# Patient Record
Sex: MALE | Race: WHITE | URBAN - METROPOLITAN AREA
[De-identification: names, ages, dates, MRNs, and addresses within clinical notes are randomized per-mention and may not be internally consistent; named-entity substitution may affect disease eponyms.]

---

## 2023-10-23 ENCOUNTER — OFFICE VISIT (OUTPATIENT)
Age: 43
End: 2023-10-23
Payer: COMMERCIAL

## 2023-10-23 VITALS — OXYGEN SATURATION: 97 % | WEIGHT: 138 LBS | TEMPERATURE: 98.2 F | HEART RATE: 81 BPM | RESPIRATION RATE: 17 BRPM

## 2023-10-23 DIAGNOSIS — M54.50 LUMBAR PAIN: Primary | ICD-10-CM

## 2023-10-23 DIAGNOSIS — M54.16 LUMBAR NEURITIS: ICD-10-CM

## 2023-10-23 PROCEDURE — 99204 OFFICE O/P NEW MOD 45 MIN: CPT | Performed by: PHYSICAL MEDICINE & REHABILITATION

## 2023-10-23 PROCEDURE — 72100 X-RAY EXAM L-S SPINE 2/3 VWS: CPT | Performed by: PHYSICAL MEDICINE & REHABILITATION

## 2023-10-23 RX ORDER — GABAPENTIN 300 MG/1
300 CAPSULE ORAL 3 TIMES DAILY
Qty: 90 CAPSULE | Refills: 1 | Status: SHIPPED | OUTPATIENT
Start: 2023-10-23 | End: 2023-12-22

## 2023-10-23 RX ORDER — METHYLPREDNISOLONE 4 MG/1
TABLET ORAL
Qty: 1 KIT | Refills: 0 | Status: SHIPPED | OUTPATIENT
Start: 2023-10-23

## 2023-10-23 RX ORDER — NAPROXEN 500 MG/1
500 TABLET ORAL 2 TIMES DAILY WITH MEALS
Qty: 30 TABLET | Refills: 0 | Status: SHIPPED | OUTPATIENT
Start: 2023-10-23

## 2023-11-24 DIAGNOSIS — M54.16 LUMBAR NEURITIS: ICD-10-CM

## 2023-11-24 DIAGNOSIS — M54.50 LUMBAR PAIN: ICD-10-CM

## 2023-11-27 RX ORDER — GABAPENTIN 300 MG/1
300 CAPSULE ORAL 3 TIMES DAILY
Qty: 90 CAPSULE | Refills: 1 | OUTPATIENT
Start: 2023-11-27 | End: 2024-01-26

## 2023-11-27 RX ORDER — NAPROXEN 500 MG/1
500 TABLET ORAL 2 TIMES DAILY WITH MEALS
Qty: 30 TABLET | Refills: 0 | OUTPATIENT
Start: 2023-11-27

## 2023-11-27 NOTE — TELEPHONE ENCOUNTER
Last visit-10/23/23  Next visit- 12/4/2023  Last rx- 10/23/2023, # 90, Rf-1        I attempted to contact  to inform him that the gabapentin prescription written on 10/23 has a refill on it. I also wanted to let him know that the naproxen is a short term supply only. He did not answer,  I left a generic voicemail with office number for call back .

## 2023-12-14 NOTE — PROGRESS NOTES
[facility]  [address]  [address]  [place], [place]  Tel: [phone]  Fax: [phone]          PROGRESS NOTE      HISTORY OF PRESENT ILLNESS:  The patient is a 37 y.o. male and was seen today for follow up of low back pain radiating to the BLE(alternates) in a S1 distribution to the feet, involves digits 2-4 on both feet. Patient says his pain started in February or March 2023 without injury. Patient says his pain is progressive in nature. Pt denies change in bowel or bladder habits. His pain is exacerbated by no position. Patient denies recent fevers, weight loss, rashes, or skin sores. No stomach ulcers or bleeding disorders. No history of spinal surgery or injections. He is done with PT (December 2023). He had benefit with PT. He is compliant with his HEP. Pt denies DM. Patient reports that to his knowledge his kidneys function properly, no labs available. He had benefit with the MDP and Naproxen (December 2023). PmHx of Smoker. Lumbar spine plain films dated 10/23/2023. 2 views: AP and Lateral. revealed: Age appropriate degenerative changes. No acute pathology identified. At his last clinical appointment, I recommended he use the back brace sparingly. I started him on a Medrol Dosepak, followed by Naproxen 500 mg BID with food for 2 weeks after the MDP. I referred him to physical therapy with an emphasis on HEP. I tried him on Gabapentin 300 mg TID. The patient returns today with pain location and distribution remains unchanged. He rates his pain 3-9/10, previously 2-10/10. Patient says that overall his pain is a little better when compared to the last office visit. He tolerates the Gabapentin 300 mg TID with benefit. He notes some initial drowsiness that has improved. He had benefit with the MDP and Naproxen (December 2023). He is done with PT (December 2023). He had benefit with PT. He is compliant with his HEP.  Pt denies change in bowel or bladder

## 2023-12-15 ENCOUNTER — OFFICE VISIT (OUTPATIENT)
Age: 43
End: 2023-12-15
Payer: COMMERCIAL

## 2023-12-15 VITALS
WEIGHT: 144 LBS | SYSTOLIC BLOOD PRESSURE: 116 MMHG | DIASTOLIC BLOOD PRESSURE: 74 MMHG | OXYGEN SATURATION: 96 % | BODY MASS INDEX: 22.6 KG/M2 | HEART RATE: 100 BPM | HEIGHT: 67 IN | TEMPERATURE: 97.8 F

## 2023-12-15 DIAGNOSIS — M54.16 LUMBAR NEURITIS: ICD-10-CM

## 2023-12-15 DIAGNOSIS — M54.50 LUMBAR PAIN: Primary | ICD-10-CM

## 2023-12-15 PROCEDURE — 99214 OFFICE O/P EST MOD 30 MIN: CPT | Performed by: PHYSICAL MEDICINE & REHABILITATION

## 2023-12-15 RX ORDER — GABAPENTIN 600 MG/1
600 TABLET ORAL 3 TIMES DAILY
Qty: 90 TABLET | Refills: 1 | Status: SHIPPED | OUTPATIENT
Start: 2023-12-15 | End: 2024-02-13

## 2023-12-29 ENCOUNTER — HOSPITAL ENCOUNTER (OUTPATIENT)
Age: 43
Discharge: HOME | End: 2023-12-29
Payer: COMMERCIAL

## 2023-12-29 DIAGNOSIS — M54.16 LUMBAR NEURITIS: ICD-10-CM

## 2023-12-29 DIAGNOSIS — M54.50 LUMBAR PAIN: ICD-10-CM

## 2023-12-29 PROCEDURE — 72148 MRI LUMBAR SPINE W/O DYE: CPT

## 2024-01-26 ENCOUNTER — OFFICE VISIT (OUTPATIENT)
Age: 44
End: 2024-01-26
Payer: COMMERCIAL

## 2024-01-26 VITALS
HEART RATE: 84 BPM | BODY MASS INDEX: 22.87 KG/M2 | OXYGEN SATURATION: 97 % | RESPIRATION RATE: 18 BRPM | WEIGHT: 146 LBS | TEMPERATURE: 98.5 F

## 2024-01-26 DIAGNOSIS — M48.07 FORAMINAL STENOSIS OF LUMBOSACRAL REGION: ICD-10-CM

## 2024-01-26 DIAGNOSIS — M48.062 SPINAL STENOSIS OF LUMBAR REGION WITH NEUROGENIC CLAUDICATION: ICD-10-CM

## 2024-01-26 DIAGNOSIS — M48.07 STENOSIS OF LATERAL RECESS OF LUMBOSACRAL SPINE: ICD-10-CM

## 2024-01-26 DIAGNOSIS — M54.16 LUMBAR NEURITIS: ICD-10-CM

## 2024-01-26 DIAGNOSIS — M51.26 HNP (HERNIATED NUCLEUS PULPOSUS), LUMBAR: Primary | ICD-10-CM

## 2024-01-26 DIAGNOSIS — M51.37 DDD (DEGENERATIVE DISC DISEASE), LUMBOSACRAL: ICD-10-CM

## 2024-01-26 PROCEDURE — 99214 OFFICE O/P EST MOD 30 MIN: CPT | Performed by: PHYSICAL MEDICINE & REHABILITATION

## 2024-01-26 RX ORDER — GABAPENTIN 600 MG/1
600 TABLET ORAL 3 TIMES DAILY
Qty: 270 TABLET | Refills: 0 | Status: SHIPPED | OUTPATIENT
Start: 2024-01-26 | End: 2024-04-25

## 2024-01-26 NOTE — PROGRESS NOTES
[facility]  [address]  [place] [address]  Tel: [phone]  Fax: [phone]          PROGRESS NOTE      HISTORY OF PRESENT ILLNESS:  The patient is a 43 y.o. male and was seen today for follow up of low back pain radiating to the BLE(alternates) in a S1 distribution to the feet, involves digits 2-4 on both feet. Patient says his pain started in February or March 2023 without injury. Patient says his pain is progressive in nature. Pt denies change in bowel or bladder habits. His pain is exacerbated by no position. Patient denies recent fevers, weight loss, rashes, or skin sores. No stomach ulcers or bleeding disorders. No history of spinal surgery or injections. He is done with PT (December 2023). He had benefit with PT. He is compliant with his HEP. Pt denies DM. Patient reports that to his knowledge his kidneys function properly, no labs available. He had benefit with the MDP and Naproxen (December 2023). PmHx of Smoker.  Lumbar spine plain films dated 10/23/2023. 2 views: AP and Lateral. revealed: Age appropriate degenerative changes. No acute pathology identified. At his last clinical appointment,  I recommended the patient continue to perform his HEP everyday. I increased his Gabapentin 300 mg TID to 600 mg TID. Patient advised to call the office if intolerant to higher dose. I ordered a L spine MRI.       The patient returns today with pain location and distribution remains unchanged. He rates his pain 2-10/10, previously 3-9/10. Patient says that overall his pain is a little better when compared to the last office visit. He is compliant with his HEP 4x weekly. He is tolerating Gabapentin 600 mg TID with some benefit.     Lumbar spine MRI dated 12/29/2023 films independently reviewed. Per report, Moderate disc protrusion at L5-S1 with annular fissure slightly asymmetric to the right. There is moderate canal stenosis at L5-S1 with severe right

## 2024-02-21 ENCOUNTER — TELEPHONE (OUTPATIENT)
Age: 44
End: 2024-02-21

## 2024-02-21 DIAGNOSIS — F41.9 ANXIETY: Primary | ICD-10-CM

## 2024-02-22 RX ORDER — DIAZEPAM 10 MG/1
TABLET ORAL
Qty: 1 TABLET | Refills: 0 | Status: SHIPPED | OUTPATIENT
Start: 2024-02-22 | End: 2024-02-28

## 2024-02-26 DIAGNOSIS — M54.50 LUMBAR PAIN: ICD-10-CM

## 2024-02-26 DIAGNOSIS — M54.16 LUMBAR NEURITIS: ICD-10-CM

## 2024-02-27 RX ORDER — GABAPENTIN 600 MG/1
600 TABLET ORAL 3 TIMES DAILY
Qty: 90 TABLET | Refills: 1 | OUTPATIENT
Start: 2024-02-27 | End: 2024-04-27

## 2024-03-12 NOTE — PROGRESS NOTES
BMI 24.28 kg/m²       CONSTITUTIONAL: NAD, A&O x 3    Ambulates without an assistive device.    MOTOR:  Straight Leg Raise: Negative, Bilaterally       Hip Flex Knee Ext Knee Flex Ankle DF GTE Ankle PF Tone   Right +4/5 +4/5 +4/5 +4/5 +4/5 +4/5 +4/5   Left +4/5 +4/5 +4/5 +4/5 +4/5 +4/5 +4/5     SENSATION: Sensation is intact to light touch throughout.         ASSESSMENT   Nakul was seen today for follow-up.    Diagnoses and all orders for this visit:    HNP (herniated nucleus pulposus), lumbar    Spinal stenosis of lumbar region with neurogenic claudication    Lumbar neuritis    Stenosis of lateral recess of lumbosacral spine    Foraminal stenosis of lumbosacral region    DDD (degenerative disc disease), lumbosacral          IMPRESSION AND PLAN:  Patient returns to the office today with c/o low back pain radiating to the BLE(alternates) in a S1 distribution to the feet, involves digits 2-4 on right foot. Multiple treatment options were discussed. He is not interested in surgery or injection at this time. I discussed increasing neuropathic medication, pt wished to proceed. I will increase his Gabapentin 600 mg TID to 800 mg TID. He was advised to call the office if intolerant to higher dose and before running out of his medication. He does not need to refill his medication at this time. I recommended he increase the frequency of his HEP to daily. The patient is Neurologically intact. I will see the patient back in 6 weeks or earlier if needed.     Written by Ramonita Sawyer, as dictated by Devin Butts MD  I examined the patient, reviewed and agree with the note.

## 2024-03-15 ENCOUNTER — OFFICE VISIT (OUTPATIENT)
Age: 44
End: 2024-03-15
Payer: COMMERCIAL

## 2024-03-15 VITALS
OXYGEN SATURATION: 97 % | HEART RATE: 94 BPM | TEMPERATURE: 97.9 F | BODY MASS INDEX: 24.33 KG/M2 | WEIGHT: 155 LBS | HEIGHT: 67 IN

## 2024-03-15 DIAGNOSIS — M48.07 STENOSIS OF LATERAL RECESS OF LUMBOSACRAL SPINE: ICD-10-CM

## 2024-03-15 DIAGNOSIS — M51.37 DDD (DEGENERATIVE DISC DISEASE), LUMBOSACRAL: ICD-10-CM

## 2024-03-15 DIAGNOSIS — M48.07 FORAMINAL STENOSIS OF LUMBOSACRAL REGION: ICD-10-CM

## 2024-03-15 DIAGNOSIS — M51.26 HNP (HERNIATED NUCLEUS PULPOSUS), LUMBAR: Primary | ICD-10-CM

## 2024-03-15 DIAGNOSIS — M48.062 SPINAL STENOSIS OF LUMBAR REGION WITH NEUROGENIC CLAUDICATION: ICD-10-CM

## 2024-03-15 DIAGNOSIS — M54.16 LUMBAR NEURITIS: ICD-10-CM

## 2024-03-15 PROCEDURE — 99214 OFFICE O/P EST MOD 30 MIN: CPT | Performed by: PHYSICAL MEDICINE & REHABILITATION

## 2024-04-26 ENCOUNTER — OFFICE VISIT (OUTPATIENT)
Age: 44
End: 2024-04-26
Payer: COMMERCIAL

## 2024-04-26 VITALS
OXYGEN SATURATION: 98 % | BODY MASS INDEX: 23.39 KG/M2 | TEMPERATURE: 98.9 F | HEIGHT: 67 IN | WEIGHT: 149 LBS | DIASTOLIC BLOOD PRESSURE: 83 MMHG | HEART RATE: 82 BPM | SYSTOLIC BLOOD PRESSURE: 138 MMHG

## 2024-04-26 DIAGNOSIS — M48.07 FORAMINAL STENOSIS OF LUMBOSACRAL REGION: ICD-10-CM

## 2024-04-26 DIAGNOSIS — M51.37 DDD (DEGENERATIVE DISC DISEASE), LUMBOSACRAL: ICD-10-CM

## 2024-04-26 DIAGNOSIS — M48.07 STENOSIS OF LATERAL RECESS OF LUMBOSACRAL SPINE: ICD-10-CM

## 2024-04-26 DIAGNOSIS — M48.062 SPINAL STENOSIS OF LUMBAR REGION WITH NEUROGENIC CLAUDICATION: ICD-10-CM

## 2024-04-26 DIAGNOSIS — M54.16 LUMBAR NEURITIS: Primary | ICD-10-CM

## 2024-04-26 DIAGNOSIS — M51.26 HNP (HERNIATED NUCLEUS PULPOSUS), LUMBAR: ICD-10-CM

## 2024-04-26 PROCEDURE — 99214 OFFICE O/P EST MOD 30 MIN: CPT | Performed by: PHYSICAL MEDICINE & REHABILITATION

## 2024-04-26 RX ORDER — GABAPENTIN 800 MG/1
800 TABLET ORAL 3 TIMES DAILY
Qty: 90 TABLET | Refills: 2 | Status: SHIPPED | OUTPATIENT
Start: 2024-04-26 | End: 2024-07-25

## 2024-04-26 RX ORDER — DULOXETIN HYDROCHLORIDE 30 MG/1
30 CAPSULE, DELAYED RELEASE ORAL
Qty: 30 CAPSULE | Refills: 2 | Status: SHIPPED | OUTPATIENT
Start: 2024-04-26

## 2024-04-26 NOTE — PROGRESS NOTES
lateral recess of lumbosacral spine  -     gabapentin (NEURONTIN) 800 MG tablet; Take 1 tablet by mouth 3 times daily for 90 days. Max Daily Amount: 2,400 mg  -     DULoxetine (CYMBALTA) 30 MG extended release capsule; Take 1 capsule by mouth nightly    Foraminal stenosis of lumbosacral region  -     gabapentin (NEURONTIN) 800 MG tablet; Take 1 tablet by mouth 3 times daily for 90 days. Max Daily Amount: 2,400 mg  -     DULoxetine (CYMBALTA) 30 MG extended release capsule; Take 1 capsule by mouth nightly    DDD (degenerative disc disease), lumbosacral  -     gabapentin (NEURONTIN) 800 MG tablet; Take 1 tablet by mouth 3 times daily for 90 days. Max Daily Amount: 2,400 mg  -     DULoxetine (CYMBALTA) 30 MG extended release capsule; Take 1 capsule by mouth nightly          IMPRESSION AND PLAN:  Patient returns to the office today with c/o low back pain with radicular symptoms into his RLE extending to his calf in a S1 distribution. Multiple treatment options were discussed. He is not interested in surgery or injection at this time. I discussed increasing his Gabapentin 600 mg QID and starting him on a second neuropathic medication, pt wished to proceed. I will increase his Gabapentin 600 mg QID to 800 mg TID. He was advised to call the office if intolerant to higher dose. I will try him on Cymbalta 30 mg QHS. The risks, benefits, and potential side effects of this medication were discussed. He understands and wishes to proceed. I advised him to continue taking the medication even if they do not see any relief. I told him to call the office if intolerant to the new medication. I recommended he increase the frequency of his HEP to daily. The patient is Neurologically intact. I will see the patient back in 2 months or earlier if needed.       Written by Ramonita Sawyer, as dictated by Devin Butts MD  I examined the patient, reviewed and agree with the note.

## 2024-07-09 ENCOUNTER — OFFICE VISIT (OUTPATIENT)
Age: 44
End: 2024-07-09
Payer: COMMERCIAL

## 2024-07-09 VITALS
HEART RATE: 88 BPM | WEIGHT: 144 LBS | BODY MASS INDEX: 22.6 KG/M2 | OXYGEN SATURATION: 97 % | HEIGHT: 67 IN | TEMPERATURE: 98 F

## 2024-07-09 DIAGNOSIS — M54.16 LUMBAR NEURITIS: Primary | ICD-10-CM

## 2024-07-09 DIAGNOSIS — M51.26 HNP (HERNIATED NUCLEUS PULPOSUS), LUMBAR: ICD-10-CM

## 2024-07-09 DIAGNOSIS — M48.062 SPINAL STENOSIS OF LUMBAR REGION WITH NEUROGENIC CLAUDICATION: ICD-10-CM

## 2024-07-09 DIAGNOSIS — M48.07 STENOSIS OF LATERAL RECESS OF LUMBOSACRAL SPINE: ICD-10-CM

## 2024-07-09 DIAGNOSIS — M48.07 FORAMINAL STENOSIS OF LUMBOSACRAL REGION: ICD-10-CM

## 2024-07-09 DIAGNOSIS — M51.37 DDD (DEGENERATIVE DISC DISEASE), LUMBOSACRAL: ICD-10-CM

## 2024-07-09 PROCEDURE — 99214 OFFICE O/P EST MOD 30 MIN: CPT | Performed by: PHYSICAL MEDICINE & REHABILITATION

## 2024-07-09 RX ORDER — DULOXETIN HYDROCHLORIDE 60 MG/1
60 CAPSULE, DELAYED RELEASE ORAL
Qty: 30 CAPSULE | Refills: 1 | Status: SHIPPED | OUTPATIENT
Start: 2024-07-09

## 2024-07-09 NOTE — PROGRESS NOTES
Patient says that overall his pain is better when compared to the last office visit, and has more days with little to no pain with Cymbalta 30 mg QHS. He denies changes in bowel or bladder habits. He is noncompliant with his daily HEP, performing them 4x/week. He is tolerating the Cymbalta 30 mg and Gabapentin 800 mg TID with benefit.     reviewed. Gabapentin 800 mg TID from me on 4/26/2024. Body mass index is 22.55 kg/m².    PCP: No, Pcp      History reviewed. No pertinent past medical history.    Social History     Socioeconomic History    Marital status: Single     Spouse name: None    Number of children: None    Years of education: None    Highest education level: None   Tobacco Use    Smoking status: Every Day     Current packs/day: 1.00     Average packs/day: 1 pack/day for 15.0 years (15.0 ttl pk-yrs)     Types: Cigarettes    Smokeless tobacco: Never   Vaping Use    Vaping Use: Never used   Substance and Sexual Activity    Alcohol use: Yes    Drug use: Yes     Frequency: 10.0 times per week     Types: Marijuana (Weed)    Sexual activity: Yes     Partners: Female       Current Outpatient Medications   Medication Sig Dispense Refill    DULoxetine (CYMBALTA) 60 MG extended release capsule Take 1 capsule by mouth nightly 30 capsule 1    gabapentin (NEURONTIN) 800 MG tablet Take 1 tablet by mouth 3 times daily for 90 days. Max Daily Amount: 2,400 mg 90 tablet 2    DULoxetine (CYMBALTA) 30 MG extended release capsule Take 1 capsule by mouth nightly 30 capsule 2    gabapentin (NEURONTIN) 600 MG tablet Take 1 tablet by mouth 3 times daily for 90 days. Max Daily Amount: 1,800 mg 270 tablet 0    gabapentin (NEURONTIN) 600 MG tablet Take 1 tablet by mouth 3 times daily for 60 days. Max Daily Amount: 1,800 mg 90 tablet 1    gabapentin (NEURONTIN) 300 MG capsule Take 1 capsule by mouth 3 times daily for 60 days. Max Daily Amount: 900 mg 90 capsule 1     No current facility-administered medications for this visit.

## 2024-07-12 ENCOUNTER — TELEPHONE (OUTPATIENT)
Age: 44
End: 2024-07-12

## 2024-07-12 DIAGNOSIS — F41.9 ANXIETY: Primary | ICD-10-CM

## 2024-07-12 RX ORDER — DIAZEPAM 10 MG/1
TABLET ORAL
Qty: 1 TABLET | Refills: 0 | Status: SHIPPED | OUTPATIENT
Start: 2024-07-12 | End: 2024-07-17

## 2024-07-22 DIAGNOSIS — M51.37 DDD (DEGENERATIVE DISC DISEASE), LUMBOSACRAL: ICD-10-CM

## 2024-07-22 DIAGNOSIS — M48.07 FORAMINAL STENOSIS OF LUMBOSACRAL REGION: ICD-10-CM

## 2024-07-22 DIAGNOSIS — M54.16 LUMBAR NEURITIS: ICD-10-CM

## 2024-07-22 DIAGNOSIS — M51.26 HNP (HERNIATED NUCLEUS PULPOSUS), LUMBAR: ICD-10-CM

## 2024-07-22 DIAGNOSIS — M48.062 SPINAL STENOSIS OF LUMBAR REGION WITH NEUROGENIC CLAUDICATION: ICD-10-CM

## 2024-07-22 DIAGNOSIS — M48.07 STENOSIS OF LATERAL RECESS OF LUMBOSACRAL SPINE: ICD-10-CM

## 2024-07-23 RX ORDER — DULOXETIN HYDROCHLORIDE 30 MG/1
30 CAPSULE, DELAYED RELEASE ORAL NIGHTLY
Qty: 30 CAPSULE | Refills: 2 | OUTPATIENT
Start: 2024-07-23

## 2024-08-15 RX ORDER — DULOXETIN HYDROCHLORIDE 60 MG/1
60 CAPSULE, DELAYED RELEASE ORAL NIGHTLY
Qty: 30 CAPSULE | Refills: 1 | OUTPATIENT
Start: 2024-08-15

## 2024-09-23 DIAGNOSIS — M51.26 HNP (HERNIATED NUCLEUS PULPOSUS), LUMBAR: ICD-10-CM

## 2024-09-23 DIAGNOSIS — M48.062 SPINAL STENOSIS OF LUMBAR REGION WITH NEUROGENIC CLAUDICATION: ICD-10-CM

## 2024-09-23 DIAGNOSIS — M54.16 LUMBAR NEURITIS: ICD-10-CM

## 2024-09-23 DIAGNOSIS — M48.07 FORAMINAL STENOSIS OF LUMBOSACRAL REGION: ICD-10-CM

## 2024-09-23 DIAGNOSIS — M51.37 DDD (DEGENERATIVE DISC DISEASE), LUMBOSACRAL: ICD-10-CM

## 2024-09-23 DIAGNOSIS — M48.07 STENOSIS OF LATERAL RECESS OF LUMBOSACRAL SPINE: ICD-10-CM

## 2024-09-23 RX ORDER — GABAPENTIN 800 MG/1
800 TABLET ORAL 3 TIMES DAILY
Qty: 90 TABLET | Refills: 0 | Status: SHIPPED | OUTPATIENT
Start: 2024-09-23 | End: 2024-10-23

## 2024-10-07 NOTE — PROGRESS NOTES
discogenic back pain and lower extremity pain          IMPRESSION AND PLAN:  Patient returns to the office today with c/o low back pain with radicular symptoms into his RLE extending to his calf in a S1 distribution. Multiple treatment options were discussed. He is not interested in surgery or injection at this time. I recommended the patient continue to perform his HEP everyday. I discussed changing his neuropathic medication, pt wished to proceed. In light of the pt's pain persisting, I will begin weaning him off the Gabapentin 800 mg TID and I will try him on Lyrica 75 mg BID. The risks, benefits, and potential side effects of this medication were discussed. He understands and wishes to proceed. I advised him to continue taking the medication even if they do not see any relief. I told him to call the office if intolerant to the new medication. I refilled his Cymbalta 60 mg QHS. I discussed ordering an EMG, pt declined at this time, he would like to reevaluate his pain after trying a new treatment plan. The patient is Neurologically intact. I will see the patient back in 2 months or earlier if needed.     Written by Tessie Pereira medical scribe, as dictated by Devin Butts MD  I examined the patient, reviewed and agree with the note.

## 2024-10-08 ENCOUNTER — OFFICE VISIT (OUTPATIENT)
Age: 44
End: 2024-10-08
Payer: COMMERCIAL

## 2024-10-08 VITALS
OXYGEN SATURATION: 98 % | TEMPERATURE: 98 F | WEIGHT: 156 LBS | BODY MASS INDEX: 24.48 KG/M2 | HEIGHT: 67 IN | HEART RATE: 73 BPM

## 2024-10-08 DIAGNOSIS — M48.07 STENOSIS OF LATERAL RECESS OF LUMBOSACRAL SPINE: ICD-10-CM

## 2024-10-08 DIAGNOSIS — M51.372 DEGENERATION OF INTERVERTEBRAL DISC OF LUMBOSACRAL REGION WITH DISCOGENIC BACK PAIN AND LOWER EXTREMITY PAIN: ICD-10-CM

## 2024-10-08 DIAGNOSIS — M54.16 LUMBAR NEURITIS: Primary | ICD-10-CM

## 2024-10-08 DIAGNOSIS — M48.07 FORAMINAL STENOSIS OF LUMBOSACRAL REGION: ICD-10-CM

## 2024-10-08 DIAGNOSIS — M48.062 SPINAL STENOSIS OF LUMBAR REGION WITH NEUROGENIC CLAUDICATION: ICD-10-CM

## 2024-10-08 DIAGNOSIS — M51.26 HNP (HERNIATED NUCLEUS PULPOSUS), LUMBAR: ICD-10-CM

## 2024-10-08 PROCEDURE — 99214 OFFICE O/P EST MOD 30 MIN: CPT | Performed by: PHYSICAL MEDICINE & REHABILITATION

## 2024-10-08 RX ORDER — PREGABALIN 75 MG/1
75 CAPSULE ORAL 2 TIMES DAILY
Qty: 60 CAPSULE | Refills: 2 | Status: SHIPPED | OUTPATIENT
Start: 2024-10-08 | End: 2025-01-06

## 2024-10-08 RX ORDER — DULOXETIN HYDROCHLORIDE 60 MG/1
60 CAPSULE, DELAYED RELEASE ORAL
Qty: 90 CAPSULE | Refills: 0 | Status: SHIPPED | OUTPATIENT
Start: 2024-10-08

## 2024-12-04 ENCOUNTER — OFFICE VISIT (OUTPATIENT)
Age: 44
End: 2024-12-04
Payer: COMMERCIAL

## 2024-12-04 VITALS
BODY MASS INDEX: 24.64 KG/M2 | HEART RATE: 85 BPM | WEIGHT: 157 LBS | TEMPERATURE: 98 F | HEIGHT: 67 IN | OXYGEN SATURATION: 97 %

## 2024-12-04 DIAGNOSIS — M48.07 STENOSIS OF LATERAL RECESS OF LUMBOSACRAL SPINE: ICD-10-CM

## 2024-12-04 DIAGNOSIS — M48.07 FORAMINAL STENOSIS OF LUMBOSACRAL REGION: ICD-10-CM

## 2024-12-04 DIAGNOSIS — M48.062 SPINAL STENOSIS OF LUMBAR REGION WITH NEUROGENIC CLAUDICATION: ICD-10-CM

## 2024-12-04 DIAGNOSIS — M51.372 DEGENERATION OF INTERVERTEBRAL DISC OF LUMBOSACRAL REGION WITH DISCOGENIC BACK PAIN AND LOWER EXTREMITY PAIN: ICD-10-CM

## 2024-12-04 DIAGNOSIS — M51.26 HNP (HERNIATED NUCLEUS PULPOSUS), LUMBAR: ICD-10-CM

## 2024-12-04 DIAGNOSIS — M54.16 LUMBAR NEURITIS: Primary | ICD-10-CM

## 2024-12-04 PROCEDURE — 99214 OFFICE O/P EST MOD 30 MIN: CPT | Performed by: PHYSICAL MEDICINE & REHABILITATION

## 2024-12-04 RX ORDER — PREGABALIN 150 MG/1
150 CAPSULE ORAL 2 TIMES DAILY
Qty: 60 CAPSULE | Refills: 2 | Status: SHIPPED | OUTPATIENT
Start: 2024-12-04 | End: 2025-03-04

## 2024-12-04 NOTE — PROGRESS NOTES
[facility]  [address]  [place] [address]  Tel: [phone]  Fax: [phone]          PROGRESS NOTE      HISTORY OF PRESENT ILLNESS:  The patient is a 44 y.o. male and was seen today for follow up of low back pain with radicular symptoms into his RLE extending to his calf in a S1 distribution. Previously seen for follow up of low back pain radiating to the BLE(alternates) in a S1 distribution to the feet, involves digits 2-4 on right foot. Previously, symptoms radiated to BLE feet.  Patient says his pain started in February or March 2023 without injury. Patient says his pain is progressive in nature. Pt denies change in bowel or bladder habits. His pain is exacerbated by no position. Patient denies recent fevers, weight loss, rashes, or skin sores. No stomach ulcers or bleeding disorders. No history of spinal surgery.  Patient tolerated a Bilateral S1 SNRB on 2/27/2024 with 60% benefit. He notes his LLE symptoms improved following the SNRB, with symptoms occasionally extending up to his calf and no longer into his toes. He notes increased tolerance to movement and physical activity.  He is done with PT (December 2023). He had benefit with PT. He is compliant with his HEP. Pt denies DM. Patient reports that to his knowledge his kidneys function properly, no labs available. He had benefit with the MDP and Naproxen (December 2023). PmHx of Smoker.  Lumbar spine plain films dated 10/23/2023. 2 views: AP and Lateral. revealed: Age appropriate degenerative changes. No acute pathology identified. Lumbar spine MRI dated 12/29/2023 films independently reviewed. Per report, Moderate disc protrusion at L5-S1 with annular fissure slightly asymmetric to the right. There is moderate canal stenosis at L5-S1 with severe right lateral recess stenosis and effacement of nerve roots extending to the right and left S1 foramina. No additional canal or foraminal compromise is

## 2025-01-13 RX ORDER — DULOXETIN HYDROCHLORIDE 60 MG/1
60 CAPSULE, DELAYED RELEASE ORAL NIGHTLY
Qty: 90 CAPSULE | Refills: 0 | Status: SHIPPED | OUTPATIENT
Start: 2025-01-13

## 2025-01-28 NOTE — PROGRESS NOTES
while the increase of Lyrica has been providing him with benefit he reports a sudden onset of increased LLE pain.        reviewed. Body mass index is 25.06 kg/m².    PCP: No, Pcp      History reviewed. No pertinent past medical history.    Social History     Socioeconomic History    Marital status: Single     Spouse name: None    Number of children: None    Years of education: None    Highest education level: None   Tobacco Use    Smoking status: Every Day     Current packs/day: 1.00     Average packs/day: 1 pack/day for 15.0 years (15.0 ttl pk-yrs)     Types: Cigarettes    Smokeless tobacco: Never   Vaping Use    Vaping status: Never Used   Substance and Sexual Activity    Alcohol use: Yes    Drug use: Yes     Frequency: 10.0 times per week     Types: Marijuana (Weed)    Sexual activity: Yes     Partners: Female       Current Outpatient Medications   Medication Sig Dispense Refill    pregabalin (LYRICA) 225 MG capsule Take 1 capsule by mouth 2 times daily for 90 days. Max Daily Amount: 450 mg 60 capsule 2    pregabalin (LYRICA) 150 MG capsule Take 1 capsule by mouth 2 times daily for 90 days. Max Daily Amount: 300 mg 60 capsule 2    DULoxetine (CYMBALTA) 60 MG extended release capsule Take 1 capsule by mouth nightly 30 capsule 1    DULoxetine (CYMBALTA) 60 MG extended release capsule TAKE 1 CAPSULE BY MOUTH EVERY NIGHT 90 capsule 0    pregabalin (LYRICA) 75 MG capsule Take 1 capsule by mouth 2 times daily for 90 days. Max Daily Amount: 150 mg 60 capsule 2    gabapentin (NEURONTIN) 800 MG tablet Take 1 tablet by mouth 3 times daily for 30 days. Max Daily Amount: 2,400 mg 90 tablet 0    DULoxetine (CYMBALTA) 30 MG extended release capsule Take 1 capsule by mouth nightly (Patient not taking: Reported on 1/29/2025) 30 capsule 2    gabapentin (NEURONTIN) 600 MG tablet Take 1 tablet by mouth 3 times daily for 90 days. Max Daily Amount: 1,800 mg 270 tablet 0    gabapentin (NEURONTIN) 600 MG tablet Take 1 tablet by

## 2025-01-29 ENCOUNTER — OFFICE VISIT (OUTPATIENT)
Age: 45
End: 2025-01-29
Payer: COMMERCIAL

## 2025-01-29 ENCOUNTER — TELEPHONE (OUTPATIENT)
Age: 45
End: 2025-01-29

## 2025-01-29 VITALS
HEART RATE: 77 BPM | BODY MASS INDEX: 25.11 KG/M2 | TEMPERATURE: 98 F | WEIGHT: 160 LBS | HEIGHT: 67 IN | OXYGEN SATURATION: 96 %

## 2025-01-29 DIAGNOSIS — M48.07 FORAMINAL STENOSIS OF LUMBOSACRAL REGION: ICD-10-CM

## 2025-01-29 DIAGNOSIS — M54.16 LUMBAR NEURITIS: Primary | ICD-10-CM

## 2025-01-29 DIAGNOSIS — M51.372 DEGENERATION OF INTERVERTEBRAL DISC OF LUMBOSACRAL REGION WITH DISCOGENIC BACK PAIN AND LOWER EXTREMITY PAIN: ICD-10-CM

## 2025-01-29 DIAGNOSIS — M48.07 STENOSIS OF LATERAL RECESS OF LUMBOSACRAL SPINE: ICD-10-CM

## 2025-01-29 DIAGNOSIS — M48.062 SPINAL STENOSIS OF LUMBAR REGION WITH NEUROGENIC CLAUDICATION: ICD-10-CM

## 2025-01-29 DIAGNOSIS — M51.26 HNP (HERNIATED NUCLEUS PULPOSUS), LUMBAR: ICD-10-CM

## 2025-01-29 PROCEDURE — 99214 OFFICE O/P EST MOD 30 MIN: CPT | Performed by: PHYSICAL MEDICINE & REHABILITATION

## 2025-01-29 RX ORDER — PREGABALIN 225 MG/1
225 CAPSULE ORAL 2 TIMES DAILY
Qty: 60 CAPSULE | Refills: 2 | Status: SHIPPED | OUTPATIENT
Start: 2025-01-29 | End: 2025-04-29

## 2025-01-29 NOTE — TELEPHONE ENCOUNTER
I tried calling to discuss, the pharmacy is closed until 2:00pm for a meal break. I will try reaching out after 2:00pm.

## 2025-01-29 NOTE — TELEPHONE ENCOUNTER
Daina pa/ January called stating the pt has 3 different RX dosages open for Pregabalin RX. She would like clarification on which ones to close out.     Pt just picked up the Pregabalin RX for 150MG on 1/20.    Please contact January and tamy.    Callback # [phone]

## 2025-01-31 NOTE — TELEPHONE ENCOUNTER
1/30/2024 I tried calling and was on hold for 30 mins and never got ahold of anybody.     1/31/2024 I tried calling again they answered and said they were putting me on hold before I could speak. They can reach out if they have any further questions

## 2025-04-14 RX ORDER — DULOXETIN HYDROCHLORIDE 60 MG/1
60 CAPSULE, DELAYED RELEASE ORAL NIGHTLY
Qty: 90 CAPSULE | Refills: 0 | Status: SHIPPED | OUTPATIENT
Start: 2025-04-14

## 2025-04-16 ENCOUNTER — TELEPHONE (OUTPATIENT)
Age: 45
End: 2025-04-16

## 2025-06-02 NOTE — PROGRESS NOTES
(LYRICA) 75 MG capsule; Take 1 capsule by mouth 2 times daily for 84 doses. Max Daily Amount: 150 mg        IMPRESSION AND PLAN:  Patient returns to the office today with c/o low back pain with radicular symptoms into the BLE (L>R) extending to his calf in a S1 distribution. Multiple treatment options were discussed. He is not interested in surgery or injection at this time. I informed the patient he has 2 injections remaining until 7/16/2024. I recommended the patient continue to perform his HEP everyday. I discussed increasing his Lyrica, pt declined. He wished to continue with the current treatment plan. I will restart his Lyrica 75 mg BID for 1 month then increase it to Lyrica 150 mg BID for 1 month and finally increase it to Lyrica 225 mg BID. The risks, benefits, and potential side effects of this medication were discussed. He understands and wishes to proceed. I advised him to continue taking the medication even if they do not see any relief. I told him to call the office if intolerant to the new medication. The pt does not need a refill of his  Cymbalta 60 mg QHS at this time. I advised the pt contacts the office before he runs out of his neuropathic medication.   The patient is Neurologically intact. I will see the patient back in 3 months or earlier if needed.     Written by Tessie Pereira medical scribe, as dictated by Devin Butts MD  I examined the patient, reviewed and agree with the note.

## 2025-06-03 ENCOUNTER — OFFICE VISIT (OUTPATIENT)
Age: 45
End: 2025-06-03
Payer: COMMERCIAL

## 2025-06-03 VITALS
HEIGHT: 67 IN | TEMPERATURE: 98 F | OXYGEN SATURATION: 98 % | HEART RATE: 74 BPM | WEIGHT: 156 LBS | BODY MASS INDEX: 24.48 KG/M2

## 2025-06-03 DIAGNOSIS — M51.26 HNP (HERNIATED NUCLEUS PULPOSUS), LUMBAR: ICD-10-CM

## 2025-06-03 DIAGNOSIS — M48.07 FORAMINAL STENOSIS OF LUMBOSACRAL REGION: ICD-10-CM

## 2025-06-03 DIAGNOSIS — M51.372 DEGENERATION OF INTERVERTEBRAL DISC OF LUMBOSACRAL REGION WITH DISCOGENIC BACK PAIN AND LOWER EXTREMITY PAIN: ICD-10-CM

## 2025-06-03 DIAGNOSIS — M48.07 STENOSIS OF LATERAL RECESS OF LUMBOSACRAL SPINE: ICD-10-CM

## 2025-06-03 DIAGNOSIS — M54.16 LUMBAR NEURITIS: Primary | ICD-10-CM

## 2025-06-03 DIAGNOSIS — M48.062 SPINAL STENOSIS OF LUMBAR REGION WITH NEUROGENIC CLAUDICATION: ICD-10-CM

## 2025-06-03 PROCEDURE — 99214 OFFICE O/P EST MOD 30 MIN: CPT | Performed by: PHYSICAL MEDICINE & REHABILITATION

## 2025-06-03 RX ORDER — PREGABALIN 225 MG/1
225 CAPSULE ORAL 2 TIMES DAILY
Qty: 180 CAPSULE | Refills: 0 | Status: SHIPPED | OUTPATIENT
Start: 2025-07-03 | End: 2025-10-01

## 2025-06-03 RX ORDER — PREGABALIN 75 MG/1
75 CAPSULE ORAL 2 TIMES DAILY
Qty: 84 CAPSULE | Refills: 0 | Status: SHIPPED | OUTPATIENT
Start: 2025-06-03 | End: 2025-07-15

## 2025-06-05 ENCOUNTER — TELEPHONE (OUTPATIENT)
Age: 45
End: 2025-06-05

## 2025-06-05 NOTE — TELEPHONE ENCOUNTER
Called patient's wife and used two identifiers,   6/4 didn't eat all day  6/4 cut on his head and stated he didn't know what his wife was talking about and was in the bed all day    Friday last week started experiencing symptoms.    Spoke with RT Adela and she confirmed he needs to  the meds and start taking them. Any worsening symptoms to proceed to the ED.    Called Renetta back and verified two identifiers and advised of the above as well as the ramp instructions for the Lyrica. Advised he needed to  the medicine to begin it as soon as possible.    Month 1 Lyrica 75 mg  Day 1-7 Take 1 Tab at night with food  Day 8 onward Take 1 tab 2x per day with food    Month 2 Lyrica 150 mg  Day 1-7 75 mg in the morning, 150 mg at night  Day 8 onward Take 150 mg 2x per day with food    Month 3 Lyrica 225 mg  Day 1-7 150 mg in the morning, 225 mg at night  Day 8 onward Take 225 mg 2x per day with food    All questions answered, Renetta verbalized understanding with intent to comply.    Additionally informed Renetta that the patient needed to fill out a new PHI form.    Renetta stated that he was irritable, not eating, down and depressed but was not slurring his speech or cognitively altered. Renetta also stated that she spoke to him between our two and he seemed in better spirits than the previous day.

## 2025-06-05 NOTE — TELEPHONE ENCOUNTER
Pt's wife, Reentta, called stating the pt has  been out of his Lyrica RX for approx 1wk and she was not aware. Friday pt began having symptoms of being withdrawn, irritable, depressed and not acting like himself. She would like a callback from a nurse to express to her what to do about this and to get pt back on meds. Pt has a RX at the pharmacy to be picked up but she does not know how Dr. Butts want pt to use meds to get back on track.    She is concerned and is asking for a callback as soon as possible.  callback# [phone]

## 2025-06-23 ENCOUNTER — TELEPHONE (OUTPATIENT)
Facility: CLINIC | Age: 45
End: 2025-06-23

## 2025-06-23 NOTE — TELEPHONE ENCOUNTER
Called and lvm on requested number that ecc left - informing dr. Lemus no longer taking appointment or new pt. Provider is leaving -       ----- Message from CECELIA POTTER LPN sent at 6/23/2025 12:24 PM EDT -----  Regarding: FW: [facility] Appointment Request  Please let them know Dr Lemus is no longer here. Thanks.  ----- Message -----  From: Calvin Hinkle  Sent: 6/23/2025  11:09 AM EDT  To: [facility] Clinical Staff  Subject: [facility] Appointment Request                          [facility] Appointment Request    Patient needs appointment for [facility] Appointment Type: New Patient.    Patient Requested Dates(s): first available  Patient Requested Time: anytime  Provider Name: Aubree Lemus MD    Reason for Appointment Request: New Patient - No appointments available during search  Daughter is calling for her son as he wanted to have an appt schedule for a new patient appt  --------------------------------------------------------------------------------------------------------------------------    Relationship to Patient: Spouse/Partner wife- [place]     Call Back Information: OK to leave message on voicemail  Preferred Call Back Number: Phone [phone]

## 2025-07-13 RX ORDER — DULOXETIN HYDROCHLORIDE 60 MG/1
60 CAPSULE, DELAYED RELEASE ORAL NIGHTLY
Qty: 90 CAPSULE | Refills: 0 | Status: SHIPPED | OUTPATIENT
Start: 2025-07-13

## 2025-09-02 ENCOUNTER — OFFICE VISIT (OUTPATIENT)
Age: 45
End: 2025-09-02
Payer: COMMERCIAL

## 2025-09-02 VITALS
HEIGHT: 67 IN | HEART RATE: 76 BPM | OXYGEN SATURATION: 97 % | BODY MASS INDEX: 24.8 KG/M2 | WEIGHT: 158 LBS | TEMPERATURE: 98 F

## 2025-09-02 DIAGNOSIS — M48.07 STENOSIS OF LATERAL RECESS OF LUMBOSACRAL SPINE: ICD-10-CM

## 2025-09-02 DIAGNOSIS — M48.07 FORAMINAL STENOSIS OF LUMBOSACRAL REGION: ICD-10-CM

## 2025-09-02 DIAGNOSIS — M48.062 SPINAL STENOSIS OF LUMBAR REGION WITH NEUROGENIC CLAUDICATION: ICD-10-CM

## 2025-09-02 DIAGNOSIS — M51.26 HNP (HERNIATED NUCLEUS PULPOSUS), LUMBAR: ICD-10-CM

## 2025-09-02 DIAGNOSIS — M51.372 DEGENERATION OF INTERVERTEBRAL DISC OF LUMBOSACRAL REGION WITH DISCOGENIC BACK PAIN AND LOWER EXTREMITY PAIN: ICD-10-CM

## 2025-09-02 DIAGNOSIS — M54.16 LUMBAR NEURITIS: Primary | ICD-10-CM

## 2025-09-02 PROCEDURE — 99214 OFFICE O/P EST MOD 30 MIN: CPT | Performed by: PHYSICAL MEDICINE & REHABILITATION

## 2025-09-02 RX ORDER — PREGABALIN 300 MG/1
300 CAPSULE ORAL 2 TIMES DAILY
Qty: 60 CAPSULE | Refills: 5 | Status: SHIPPED | OUTPATIENT
Start: 2025-09-02 | End: 2026-01-30

## 2025-09-02 RX ORDER — DULOXETIN HYDROCHLORIDE 60 MG/1
60 CAPSULE, DELAYED RELEASE ORAL
Qty: 30 CAPSULE | Refills: 1 | Status: SHIPPED | OUTPATIENT
Start: 2025-09-02